# Patient Record
Sex: MALE | URBAN - METROPOLITAN AREA
[De-identification: names, ages, dates, MRNs, and addresses within clinical notes are randomized per-mention and may not be internally consistent; named-entity substitution may affect disease eponyms.]

---

## 2023-10-24 ENCOUNTER — APPOINTMENT (OUTPATIENT)
Dept: URBAN - METROPOLITAN AREA SURGERY 15 | Age: 83
Setting detail: DERMATOLOGY
End: 2023-10-25

## 2023-10-24 PROBLEM — C44.319 BASAL CELL CARCINOMA OF SKIN OF OTHER PARTS OF FACE: Status: ACTIVE | Noted: 2023-10-24

## 2023-10-24 PROCEDURE — OTHER MOHS SURGERY: OTHER

## 2023-10-24 PROCEDURE — 13132 CMPLX RPR F/C/C/M/N/AX/G/H/F: CPT

## 2023-10-24 PROCEDURE — OTHER COUNSELING: OTHER

## 2023-10-24 PROCEDURE — 17311 MOHS 1 STAGE H/N/HF/G: CPT

## 2023-10-24 PROCEDURE — OTHER SURGICAL DECISION MAKING: OTHER

## 2023-10-24 NOTE — PROCEDURE: SURGICAL DECISION MAKING
Risk Assessment Explanation (Does Not Render In The Note): Clinical determination of the probability and/or consequences of an event, such as surgery. Clinical assessment of the level of risk is affected by the nature of the event under consideration for the patient. Modifier 57 is used to indicate an Evaluation and Management (E/M) service resulted in the initial decision to perform surgery either the day before a major surgery (90 day global) or the day of a major surgery.
Complexity (Necessary For Coding; Major - 90 Day Global With Some Exceptions; Minor - 10 Day Global): major
Date Of Surgery - Today Or Tomorrow?: today
Discussion: After the mohs procedure was complete, a separate and distinct evaluation and management was performed for the resultant surgical defect for potential reconstruction using flap or graft.  Complications and risk of morbidity of each were discussed including (but not limited to) scarring, which could distort a free anatomic margin of the eyelid, nose, ear or lip.
Initial Decision For Surgery?: Yes

## 2024-03-04 ENCOUNTER — COMPREHENSIVE EXAM (OUTPATIENT)
Facility: LOCATION | Age: 84
End: 2024-03-04

## 2024-03-04 DIAGNOSIS — H52.11: ICD-10-CM

## 2024-03-04 DIAGNOSIS — H43.812: ICD-10-CM

## 2024-03-04 DIAGNOSIS — H02.834: ICD-10-CM

## 2024-03-04 DIAGNOSIS — H02.831: ICD-10-CM

## 2024-03-04 DIAGNOSIS — H52.203: ICD-10-CM

## 2024-03-04 DIAGNOSIS — H25.813: ICD-10-CM

## 2024-03-04 DIAGNOSIS — H52.4: ICD-10-CM

## 2024-03-04 DIAGNOSIS — H40.033: ICD-10-CM

## 2024-03-04 PROCEDURE — 92014 COMPRE OPH EXAM EST PT 1/>: CPT

## 2024-03-04 PROCEDURE — 92015 DETERMINE REFRACTIVE STATE: CPT

## 2024-03-04 ASSESSMENT — VISUAL ACUITY
OS_CC: J2
OD_CC: 20/50-1
OS_PH: 20/40+2
OD_PH: 20/40+2
OD_CC: J2
OD_GLARE: 20/200
OS_GLARE: 20/100
OS_CC: 20/60-2

## 2024-03-04 ASSESSMENT — TONOMETRY
OD_IOP_MMHG: 9
OS_IOP_MMHG: 10

## 2024-03-04 ASSESSMENT — KERATOMETRY
OS_AXISANGLE2_DEGREES: 172
OD_AXISANGLE_DEGREES: 103
OS_K2POWER_DIOPTERS: 46.00
OS_K1POWER_DIOPTERS: 45.00
OD_AXISANGLE2_DEGREES: 13
OD_K1POWER_DIOPTERS: 44.25
OS_AXISANGLE_DEGREES: 82
OD_K2POWER_DIOPTERS: 45.50

## 2025-02-04 ENCOUNTER — FOLLOW UP (OUTPATIENT)
Age: 85
End: 2025-02-04

## 2025-02-04 DIAGNOSIS — H40.033: ICD-10-CM

## 2025-02-04 DIAGNOSIS — H02.831: ICD-10-CM

## 2025-02-04 DIAGNOSIS — H02.834: ICD-10-CM

## 2025-02-04 DIAGNOSIS — H25.813: ICD-10-CM

## 2025-02-04 DIAGNOSIS — H43.812: ICD-10-CM

## 2025-02-04 PROCEDURE — 92012 INTRM OPH EXAM EST PATIENT: CPT
